# Patient Record
Sex: MALE | Race: WHITE | Employment: UNEMPLOYED | URBAN - METROPOLITAN AREA
[De-identification: names, ages, dates, MRNs, and addresses within clinical notes are randomized per-mention and may not be internally consistent; named-entity substitution may affect disease eponyms.]

---

## 2021-02-18 ENCOUNTER — MEDICAL CORRESPONDENCE (OUTPATIENT)
Dept: HEALTH INFORMATION MANAGEMENT | Facility: CLINIC | Age: 31
End: 2021-02-18

## 2021-03-11 ENCOUNTER — HOSPITAL ENCOUNTER (OUTPATIENT)
Dept: NUTRITION | Facility: CLINIC | Age: 31
Discharge: HOME | End: 2021-03-11
Attending: PHYSICIAN ASSISTANT | Admitting: PHYSICIAN ASSISTANT
Payer: COMMERCIAL

## 2021-03-11 PROCEDURE — 97802 MEDICAL NUTRITION INDIV IN: CPT | Mod: 95 | Performed by: DIETITIAN, REGISTERED

## 2021-03-11 NOTE — PROGRESS NOTES
Video-Visit Details    Type of service:  Video Visit    Video Start Time (time video started): 2:28    Video End Time (time video stopped): 3:14    Originating Location (pt. Location): Other treatment center    Distant Location (provider location):  [facility] NUTRITION SERVICES     Mode of Communication:  Video Conference via [facility]    OUTPATIENT NUTRITION ASSESSMENT (VIDEO VISIT DUE TO COVID-19)  REASON FOR ASSESSMENT  Michael Abraham referred by Mike Velasquez PA-C for MNT related to eating disorder.    Patient accompanied by self     ASSESSMENT   Nutrition History:  - Information obtained from patient.  - Patient is on a restricted diet at home/treatment center.  Patient with history of binge eating since his dad passed away at age 11 and mother passed away at age 22.  Patient concerned about heart health as both parents passed away related to heart issues.  Patient was fasting 20 hours and limiting calories to 1200 per day. Patient states he lost 80 lbs in 3 months (300 lbs-220 lbs).  Patient binge eats 1 time per week Saturday (eats 3 meals and eats whatever he wants entire cheesecake, pint of Delta and Jose's.  Patient fasts on Sunday and then does not eat again until Monday night.      [facility]  Patient states has worked with dietitians in the past for his eating disorder.    Patient states he is at treatment center for 90 days due to suicide attempt.     Diet Recall:  Breakfast: skips   Lunch: ham and cheese on hoagie bun with miracle whip, potato chips + 12 red grapes + baby carrots + 3.5 (3) thousand island dressing packets; cheesy mexican rice + 2 mild taco packets + burrito + sour cream + pear; 3 chicken wings 2 oz ranch 2 packets + ketchup + french fries + slices pears   Binge 2 small bags Keebler cookies + nut butter (2 bars)   Dinner: taco salad with 1 cup beef, nelli cheese, 1 cup black beans, 1 cup corn salsa, nelli cheese  "chips, lettuce and 1% milk ; 2 small pieces BB chicken + scalloped cheesy potatoes, mixed vegetables + lettuce tomato + cheese + fat free [place] ; 1.5 slices [place] steak, mashed potatoes, bread stick, salad, lettuce, tomato, cheese, 1 thousand island packet, 2 packets ketchup + 1 cup pineapple + 1 pear   Snack: limits   Beverages: 120 oz water   Dining out: none at this time   May have 2 bags candy bars during binge.  Patient was consuming 8,000-10,000 calories per day     Exercise: Patient speed walks 60 minutes per day 7 days per week + 20 pushups + 1 minute plank.    NUTRITION FOCUSED PHYSICAL ASSESSMENT (NFPA) FOR DIAGNOSING MALNUTRITION  Yes          Observed:   No nutrition-related physical findings observed    Obtained from Chart/Interdisciplinary Team:  None noted     LABS  Labs reviewed    MEDICATIONS  Medications reviewed    ANTHROPOMETRICS   Height: 5'11\"  Weight: 220 lbs (per pt)  BMI (kg/m2): 30.8kg/m2  Weight Status:  Obesity Grade I BMI 30-34.9  %IBW: 132%  Weight History:   Wt Readings from Last 10 Encounters:   10/31/14 108.9 kg (240 lb)     ASSESSED NUTRITION NEEDS  Estimated Energy Needs: 1565-9049 kcals/day (15-17 Kcal/Kg)  Justification:  (obese)  Estimated Protein Needs: 78-93 grams protein/day (1-1.2 g pro/Kg)  Justification:  (preservation of lean body mass)  Estimated Fluid Needs: 3456-1331 mL/day (30-35 mL/kg)    ASSESSED MALNUTRITION STATUS  % Weight Loss:  > 7.5% in 3 months (severe malnutrition) 25% weight loss in 3 months   % Intake:  <75% for >/= 3 months (non-severe malnutrition)  Subcutaneous Fat Loss:  None observed  Loss of Muscle Mass:  None observed  Fluid Retention:  None noted    Malnutrition Diagnosis:  Non-Severe malnutrition  In the Context of:  Chronic illness or disease    DIAGNOSIS   Nutrition Diagnosis:  Disordered eating pattern related to preoccupation with weight and body as evidenced by compensatory behaviors of binge eating, calorie counting and restriction   "     INTERVENTIONS   Nutrition Prescription   Recommend normalized eating pattern     IMPLEMENTATION   Assessed learning needs and learning preference  Teaching Method(s) used: Explanation  Vitamin and Mineral Supplements: recommend complete multivitamin and mineral   Diet Education:  Provided education on normalized eating pattern   Nutrition Education (Content):   a)  Discussed current eating pattern and ways to modify eating by eating 3 meals per day.  Patient is willing to consider eating breakfast.  Reviewed normalized eating and small steps patient could take to limit restriction.  Suggest patient reduce condiments as patient states his blood pressure is elevated and consumes multiple packets of condiments at meals.  Patient would like to continue losing weight but knows he cannot lose 7 lbs per week as he was when fasting and restricting calorie intake.  Supported patient with his struggle with eating disorder.     Nutrition Education (Application):   a)  Discussed current food options for breakfast at treatment center.    b)  Patient verbalizes understanding of diet by stating will reduce condiments.   Expected patient engagement: good    GOALS  Reduce condiments by 50%   Aim for 1800 calories per day to reduce restriction     FOLLOW UP/MONITORING   Progress towards goals will be monitored and evaluated per protocol and Practice Guidelines  Patient to follow up in 2 weeks  RD name and number provided     Time Spent with Patient  46 minutes    Rebeca Moreno, RD, LD  [facility] Outpatient Dietitian  [phone] (office phone)

## 2021-03-24 ENCOUNTER — HOSPITAL ENCOUNTER (OUTPATIENT)
Dept: NUTRITION | Facility: CLINIC | Age: 31
Discharge: HOME | End: 2021-03-24
Attending: DIETITIAN, REGISTERED | Admitting: DIETITIAN, REGISTERED
Payer: COMMERCIAL

## 2021-03-24 PROCEDURE — 97803 MED NUTRITION INDIV SUBSEQ: CPT | Mod: GT | Performed by: DIETITIAN, REGISTERED

## 2021-03-24 NOTE — PROGRESS NOTES
Video-Visit Details    Type of service:  Video Visit    Video Start Time (time video started): 2:03    Video End Time (time video stopped): 2:26    Originating Location (pt. Location): Other treatment center    Distant Location (provider location):  [facility] NUTRITION SERVICES     Mode of Communication:  Video Conference via [facility]    OUTPATIENT NUTRITION ASSESSMENT (VIDEO VISIT DUE TO COVID-19)  REASON FOR ASSESSMENT  Michael Abraham referred by Mike Velasquez PA-C for MNT related to eating disorder.    Patient accompanied by self      ASSESSMENT   Nutrition History:  - Information obtained from patient.  - Patient is on a restricted diet at home/treatment center.  Patient with history of binge eating since his dad passed away at age 11 and mother passed away at age 22.  Patient concerned about heart health as both parents passed away related to heart issues.  Patient was fasting 20 hours and limiting calories to 1200 per day. Patient states he lost 80 lbs in 3 months (300 lbs-220 lbs).  Patient binge eats 1 time per week Saturday (eats 3 meals and eats whatever he wants entire cheesecake, pint of Delta and Jose's.  Patient fasts on Sunday and then does not eat again until Monday night.       [facility].  Patient states has worked with dietitians in the past for his eating disorder.     Patient states he is at treatment center for 90 days due to suicide attempt.     3/24/2021 Patient states has increased binging.  Patient bought portion control cups to help with foods being served at treatment center.  Patient binges 3 times per week.  Patient concerned about his blood pressure and states was asked by his nurse to discuss during appointment.    Recent intake:  3/20 binge day (3 slices stuffed crust pepperoni pizza, edible cookie doigh (16 oz) 2 oz pizza suace, BBQ sauce, ranch dressing, honey mustard, strawberry cream cheese, ranch, marianara  sauce, 1 cup lettuce, 1.3 oz thousand island dressing, apple, pear, 16 oz cookie dough, 3 pieces fried fish, french fries 6 packets tater sauce    3/21/2021 1 bowl René Charms cereal, 1 bowl Cocoa Puffs cereal, milk, 2 strawberry cream cheese containers, 2 popcorn shrimp, french fries, 2 biscuits, 2 thousand island dressing packers, eggs, gravy, peachs, hard boiled eggs, meat malick with chives and cheese    3/22/2021 2 bagels, 6 containers 1 oz peanut butter, 6 bowls Honey Nut Cheerios, 2 cheam cheese, 3 strawberry jam, hash browns, eggs with cheese and sausage,   blueebery bagel, cocoa puffs, 8 -3/4 oz cream cheese, 1 pink starburst, 1 apple, 1 fruit cocktail, 2 burrtio (beef and cheese) 2 bags nachos chips, 2 cups nelli cheese, 4 sour cream, thousand island dressing packets, 2 slices carrot cake, 2 chicken delong meals, 1 vegetable, 1 thousand island packet, 2 Rice Krispie bars    Patient states his weight fluctuating between 219-232 lbs.      Diet Recall:  Breakfast: skips   Lunch: ham and cheese on hoagie bun with miracle whip, potato chips + 12 red grapes + baby carrots + 3.5 (3) thousand island dressing packets; cheesy mexican rice + 2 mild taco packets + burrito + sour cream + pear; 3 chicken wings 2 oz ranch 2 packets + ketchup + french fries + slices pears   Binge 2 small bags Keebler cookies + nut butter (2 bars)   Dinner: taco salad with 1 cup beef, nelli cheese, 1 cup black beans, 1 cup corn salsa, nelli cheese chips, lettuce and 1% milk ; 2 small pieces BB chicken + scalloped cheesy potatoes, mixed vegetables + lettuce tomato + cheese + fat free french ; 1.5 slices [place] steak, mashed potatoes, bread stick, salad, lettuce, tomato, cheese, 1 thousand island packet, 2 packets ketchup + 1 cup pineapple + 1 pear   Snack: limits   Beverages: 120 oz water   Dining out: none at this time   May have 2 bags candy bars during binge.  Patient was consuming 8,000-10,000 calories per day      Exercise: Patient  "speed walks 60 minutes per day 7 days per week + 20 pushups + 1 minute plank.     NUTRITION FOCUSED PHYSICAL ASSESSMENT (NFPA) FOR DIAGNOSING MALNUTRITION  Yes          Observed:   No nutrition-related physical findings observed     Obtained from Chart/Interdisciplinary Team:  None noted      LABS  Labs reviewed     MEDICATIONS  Medications reviewed     ANTHROPOMETRICS   Height: 5'11\"  Weight: 232 lbs (per pt)  BMI (kg/m2): 32.3 kg/m2  Weight Status:  Obesity Grade I BMI 30-34.9  %IBW: 134%  Weight History: None known.      ASSESSED NUTRITION NEEDS  Estimated Energy Needs: 6260-5849 kcals/day (15-17 Kcal/Kg)  Justification:  (obese)  Estimated Protein Needs: 78-93 grams protein/day (1-1.2 g pro/Kg)  Justification:  (preservation of lean body mass)  Estimated Fluid Needs: 6994-3114 mL/day (30-35 mL/kg)     ASSESSED MALNUTRITION STATUS  % Weight Loss:  > 7.5% in 3 months (severe malnutrition) 25% weight loss in 3 months   % Intake:  <75% for >/= 3 months (non-severe malnutrition)  Subcutaneous Fat Loss:  None observed  Loss of Muscle Mass:  None observed  Fluid Retention:  None noted     Malnutrition Diagnosis:  Non-Severe malnutrition  In the Context of:  Chronic illness or disease    EVALUATION/PROGRESS TOWARDS GOALS  Previous nutrition goals:  Reduce condiments by 50%-not met   Aim for 1800 calories per day to reduce restriction-not met     Previous nutrition diagnosis: Disordered eating pattern related to preoccupation with weight and body as evidenced by compensatory behaviors of binge eating, calorie counting and restriction -no change     Current nutrition diagnosis: Disordered eating pattern related to preoccupation with weight and body as evidenced by compensatory behaviors of binge eating, calorie counting and restriction         INTERVENTIONS   Nutrition Prescription   Recommend normalized eating pattern      IMPLEMENTATION   Assessed learning needs and learning preference  Teaching Method(s) used: " Explanation  Vitamin and Mineral Supplements: recommend complete multivitamin and mineral   Diet Education:  Provided education on normalized eating pattern   Nutrition Education (Content):              a)  Discussed progress towards goals.  Reviewed intake.  Discussed current eating pattern and ways to reduced sodium content in diet.  Instructed patient to read label on salad dressing packet which was 300 mg per packet which patient consumes multiple per meal.  Discussed DASH diet with focus on fruits and vegetables.  Congratulated patient for starting to eat breakfast.  Suggest patient reduce condiments as patient states his blood pressure is elevated and consumes multiple packets of condiments at meals.  Supported patient with his struggle with eating disorder.   Nutrition Education (Application):              a)  Discussed current food options for breakfast at treatment center.               b)  Patient verbalizes understanding of diet by stating will reduce condiments.   Expected patient engagement: good     GOALS  Aim for 2 fruit servings at breakfast  Aim for 2 vegetable servings at lunch and dinner   Reduce condiment packets to 4 per day     FOLLOW UP/MONITORING   Progress towards goals will be monitored and evaluated per protocol and Practice Guidelines  Patient to follow up in 2 weeks  RD name and number provided      Time Spent with Patient  23 minutes     Rebeca Moreno, RD, LD  [facility] Outpatient Dietitian  [phone] (office phone)

## 2021-04-12 ENCOUNTER — HOSPITAL ENCOUNTER (OUTPATIENT)
Dept: NUTRITION | Facility: CLINIC | Age: 31
Discharge: HOME | End: 2021-04-12
Attending: DIETITIAN, REGISTERED | Admitting: DIETITIAN, REGISTERED
Payer: COMMERCIAL

## 2021-04-12 PROCEDURE — 97803 MED NUTRITION INDIV SUBSEQ: CPT | Mod: 95 | Performed by: DIETITIAN, REGISTERED

## 2021-04-12 NOTE — PROGRESS NOTES
Originating Location (pt. Location): [facility]     Distant Location (provider location):  [facility] NUTRITION SERVICES     Mode of Communication: Telephone     OUTPATIENT NUTRITION ASSESSMENT (TELEPHONE VISIT DUE TO COVID-19)  REASON FOR ASSESSMENT  Michael Abraham referred by Mike Velasquez PA-C for MNT related to eating disorder.    Patient accompanied by [facility] staff worker     ASSESSMENT   Nutrition History:  - Information obtained from patient.  - Patient is on a restricted diet at home/treatment center.  Patient with history of binge eating since his dad passed away at age 11 and mother passed away at age 24.  Patient concerned about heart health as both parents passed away related to heart issues.  Patient was fasting 20 hours and limiting calories to 1200 per day. Patient states he lost 80 lbs in 3 months (300 lbs-220 lbs).  Patient binge eats 1 time per week Saturday (eats 3 meals and eats whatever he wants entire cheesecake, pint of Delta and Jose's.  Patient fasts on Sunday and then does not eat again until Monday night.       Patient had eating disorder evaluation at the [facility].  Patient states has worked with dietitians in the past for his eating disorder.     Patient states he is at treatment center for 90 days due to suicide attempt.      3/24/2021 Patient states has increased binging.  Patient bought portion control cups to help with foods being served at treatment center.  Patient binges 3 times per week.  Patient concerned about his blood pressure and states was asked by his nurse to discuss during appointment.     Recent intake:  3/20 binge day (3 slices stuffed crust pepperoni pizza, edible cookie doigh (16 oz) 2 oz pizza suace, BBQ sauce, ranch dressing, honey mustard, strawberry cream cheese, ranch, marianara sauce, 1 cup lettuce, 1.3 oz thousand island dressing, apple, pear, 16 oz cookie dough, 3 pieces fried fish, french fries 6 packets tater  "sauce     3/21/2021 1 bowl René Charms cereal, 1 bowl Cocoa Puffs cereal, milk, 2 strawberry cream cheese containers, 2 popcorn shrimp, french fries, 2 biscuits, 2 thousand island dressing packers, eggs, gravy, peachs, hard boiled eggs, meat malick with chives and cheese     3/22/2021 2 bagels, 6 containers 1 oz peanut butter, 6 bowls Honey Nut Cheerios, 2 cheam cheese, 3 strawberry jam, hash browns, eggs with cheese and sausage,   blueebery bagel, cocoa puffs, 8 -3/4 oz cream cheese, 1 pink starburst, 1 apple, 1 fruit cocktail, 2 burrtio (beef and cheese) 2 bags nachos chips, 2 cups nelli cheese, 4 sour cream, thousand island dressing packets, 2 slices carrot cake, 2 chicken delong meals, 1 vegetable, 1 thousand island packet, 2 Rice Krispie bars     Patient states his weight fluctuating between 219-232 lbs.     4/12/2021 binge eating daily; weight increase 250 lbs; ordering tub of ice cream and eating entire container.  Patient struggles with \"good\" and \"bad\".  Patient working with therapist for phobias but not eating disorder. Patient with multiple questions that are therapist related.  Patient states his best friends and parents were killed in a car accident last night so feels out of control today regarding wanting to binge.       Diet Recall:  Breakfast: skips   Lunch: ham and cheese on hoagie bun with miracle whip, potato chips + 12 red grapes + baby carrots + 3.5 (3) thousand island dressing packets; cheesy mexican rice + 2 mild taco packets + burrito + sour cream + pear; 3 chicken wings 2 oz ranch 2 packets + ketchup + french fries + slices pears   Binge 2 small bags Keebler cookies + nut butter (2 bars)   Dinner: taco salad with 1 cup beef, nelli cheese, 1 cup black beans, 1 cup corn salsa, nelli cheese chips, lettuce and 1% milk ; 2 small pieces BB chicken + scalloped cheesy potatoes, mixed vegetables + lettuce tomato + cheese + fat free french ; 1.5 slices [place] steak, mashed potatoes, bread stick, " "salad, lettuce, tomato, cheese, 1 thousand island packet, 2 packets ketchup + 1 cup pineapple + 1 pear   Snack: limits   Beverages: 120 oz water   Dining out: none at this time   May have 2 bags candy bars during binge.  Patient was consuming 8,000-10,000 calories per day      Exercise: Patient speed walks 60 minutes per day 7 days per week + 20 pushups + 1 minute plank.     NUTRITION FOCUSED PHYSICAL ASSESSMENT (NFPA) FOR DIAGNOSING MALNUTRITION  Yes          Observed:   No nutrition-related physical findings observed     Obtained from Chart/Interdisciplinary Team:  None noted      LABS  Labs reviewed     MEDICATIONS  Medications reviewed     ANTHROPOMETRICS   Height: 5'11\"  Weight: 250 lbs (per pt)  BMI (kg/m2): 34.8 kg/m2  Weight Status:  Obesity Grade I BMI 30-34.9  %IBW: 145%  Weight History: None known.      ASSESSED NUTRITION NEEDS  Estimated Energy Needs: 4518-3970 kcals/day (15-17 Kcal/Kg)  Justification:  (obese)  Estimated Protein Needs: 78-93 grams protein/day (1-1.2 g pro/Kg)  Justification:  (preservation of lean body mass)  Estimated Fluid Needs: 1122-5677 mL/day (30-35 mL/kg)     ASSESSED MALNUTRITION STATUS  % Weight Loss:  > 7.5% in 3 months (severe malnutrition) 25% weight loss in 3 months   % Intake:  <75% for >/= 3 months (non-severe malnutrition)  Subcutaneous Fat Loss:  None observed  Loss of Muscle Mass:  None observed  Fluid Retention:  None noted     Malnutrition Diagnosis:  Non-Severe malnutrition  In the Context of:  Chronic illness or disease     EVALUATION/PROGRESS TOWARDS GOALS  Previous nutrition goals:  Aim for 2 fruit servings at breakfast-not met   Aim for 2 vegetable servings at lunch and dinner-not met   Reduce condiment packets to 4 per day-not met     Previous nutrition diagnosis: Disordered eating pattern related to preoccupation with weight and body as evidenced by compensatory behaviors of binge eating, calorie counting and restriction -no change      Current nutrition " diagnosis: Disordered eating pattern related to preoccupation with weight and body as evidenced by compensatory behaviors of binge eating, calorie counting and restriction         INTERVENTIONS   Nutrition Prescription   Recommend normalized eating pattern      IMPLEMENTATION   Assessed learning needs and learning preference  Teaching Method(s) used: Explanation  Vitamin and Mineral Supplements: recommend complete multivitamin and mineral   Diet Education:  Provided education on normalized eating pattern   Nutrition Education (Content):              a)  Discussed progress towards goals.  Reviewed intake.  Discussed current eating pattern and structured eating to reduce binging.  Congratulated patient for his awareness of increased binging.  Recommend patient make about with eating disorder therapist.  Offered suggestions for local eating disorder locations.  Supported patient with his struggle with eating disorder.   Nutrition Education (Application):              a)  Discussed eating provided meal at treatment center and limiting binge foods from the extra refrigerator.              b)  Patient verbalizes understanding of diet by stating will aim to eat main meal.   Expected patient engagement: good     GOALS  Eat provided meals at treatment center  Avoid foods from extra refrigerator at treatment center      FOLLOW UP/MONITORING   Progress towards goals will be monitored and evaluated per protocol and Practice Guidelines  Patient to follow up in 1 week  RD name and number provided      Time Spent with Patient  15 minutes     Rebeca Moreno, RD, LD  [facility] Outpatient Dietitian  [phone] (office phone)

## 2021-04-23 ENCOUNTER — HOSPITAL ENCOUNTER (OUTPATIENT)
Dept: NUTRITION | Facility: CLINIC | Age: 31
Discharge: HOME | End: 2021-04-23
Attending: DIETITIAN, REGISTERED | Admitting: DIETITIAN, REGISTERED
Payer: COMMERCIAL

## 2021-04-23 PROCEDURE — 97803 MED NUTRITION INDIV SUBSEQ: CPT | Mod: 95 | Performed by: DIETITIAN, REGISTERED

## 2021-04-23 NOTE — PROGRESS NOTES
Video-Visit Details    Type of service:  Video Visit    Video Start Time (time video started): 1:34    Video End Time (time video stopped): 1:50    Originating Location (pt. Location): Other treatment center     Distant Location (provider location):  [facility] NUTRITION SERVICES -remote     Mode of Communication:  Video Conference via [facility]    OUTPATIENT NUTRITION ASSESSMENT (VIDEO VISIT DUE TO COVID-19)  REASON FOR ASSESSMENT  Michael Abraham referred by Mike Velasquez PA-C for MNT related to eating disorder.    Patient accompanied by self.     ASSESSMENT   Nutrition History:  - Information obtained from patient.  - Patient is on a restricted diet at home/treatment center.  Patient with history of binge eating since his dad passed away at age 11 and mother passed away at age 22.  Patient concerned about heart health as both parents passed away related to heart issues.  Patient was fasting 20 hours and limiting calories to 1200 per day. Patient states he lost 80 lbs in 3 months (300 lbs-220 lbs).  Patient binge eats 1 time per week Saturday (eats 3 meals and eats whatever he wants entire cheesecake, pint of Delta and Jose's.  Patient fasts on Sunday and then does not eat again until Monday night.       Patient had [facility].  Patient states has worked with dietitians in the past for his eating disorder.     Patient states he is at treatment center for 90 days due to suicide attempt.      3/24/2021 Patient states has increased binging.  Patient bought portion control cups to help with foods being served at treatment center.  Patient binges 3 times per week.  Patient concerned about his blood pressure and states was asked by his nurse to discuss during appointment.     Recent intake:  3/20 binge day (3 slices stuffed crust pepperoni pizza, edible cookie doigh (16 oz) 2 oz pizza suace, BBQ sauce, ranch dressing, honey mustard, strawberry cream cheese, ranch,  "marianara sauce, 1 cup lettuce, 1.3 oz thousand island dressing, apple, pear, 16 oz cookie dough, 3 pieces fried fish, french fries 6 packets tater sauce     3/21/2021 1 bowl René Charms cereal, 1 bowl Cocoa Puffs cereal, milk, 2 strawberry cream cheese containers, 2 popcorn shrimp, french fries, 2 biscuits, 2 thousand island dressing packers, eggs, gravy, peachs, hard boiled eggs, meat malick with chives and cheese     3/22/2021 2 bagels, 6 containers 1 oz peanut butter, 6 bowls Honey Nut Cheerios, 2 cheam cheese, 3 strawberry jam, hash browns, eggs with cheese and sausage,   blueebery bagel, cocoa puffs, 8 -3/4 oz cream cheese, 1 pink starburst, 1 apple, 1 fruit cocktail, 2 burrtio (beef and cheese) 2 bags nachos chips, 2 cups nelli cheese, 4 sour cream, thousand island dressing packets, 2 slices carrot cake, 2 chicken delong meals, 1 vegetable, 1 thousand island packet, 2 Rice Krispie bars     Patient states his weight fluctuating between 219-232 lbs.      4/12/2021 binge eating daily; weight increase 250 lbs; ordering tub of ice cream and eating entire container.  Patient struggles with \"good\" and \"bad\".  Patient working with therapist for phobias but not eating disorder. Patient with multiple questions that are therapist related.  Patient states his best friends and parents were killed in a car accident last night so feels out of control today regarding wanting to binge.      4/23/2021  Patient states [facility] and is on 8 week waiting list.  Patient was hospitalized due to mental health crisis.  Patient dealing with multiple recent deaths.  Patient states his binging has increased and can't stop.      Diet Recall:  Breakfast: skips   Lunch: ham and cheese on hoagie bun with miracle whip, potato chips + 12 red grapes + baby carrots + 3.5 (3) thousand island dressing packets; cheesy mexican rice + 2 mild taco packets + burrito + sour cream + pear; 3 chicken wings 2 oz ranch 2 packets + " "ketchup + french fries + slices pears   Binge 2 small bags Keebler cookies + nut butter (2 bars)   Dinner: taco salad with 1 cup beef, nelli cheese, 1 cup black beans, 1 cup corn salsa, nelli cheese chips, lettuce and 1% milk ; 2 small pieces BB chicken + scalloped cheesy potatoes, mixed vegetables + lettuce tomato + cheese + fat free french ; 1.5 slices Killian steak, mashed potatoes, bread stick, salad, lettuce, tomato, cheese, 1 thousand island packet, 2 packets ketchup + 1 cup pineapple + 1 pear   Snack: limits   Beverages: 120 oz water   Dining out: none at this time   May have 2 bags candy bars during binge.  Patient was consuming 8,000-10,000 calories per day      Exercise: Patient speed walks 60 minutes per day 7 days per week + 20 pushups + 1 minute plank.     NUTRITION FOCUSED PHYSICAL ASSESSMENT (NFPA) FOR DIAGNOSING MALNUTRITION  Yes          Observed:   No nutrition-related physical findings observed     Obtained from Chart/Interdisciplinary Team:  None noted      LABS  Labs reviewed     MEDICATIONS  Medications reviewed     ANTHROPOMETRICS   Height: 5'11\"  Weight: 250 lbs (per pt)  BMI (kg/m2): 34.8 kg/m2  Weight Status:  Obesity Grade I BMI 30-34.9  %IBW: 145%  Weight History: None known.      ASSESSED NUTRITION NEEDS  Estimated Energy Needs: 2658-9203 kcals/day (15-17 Kcal/Kg)  Justification:  (obese)  Estimated Protein Needs: 78-93 grams protein/day (1-1.2 g pro/Kg)  Justification:  (preservation of lean body mass)  Estimated Fluid Needs: 3607-5959 mL/day (30-35 mL/kg)     ASSESSED MALNUTRITION STATUS  % Weight Loss:  > 7.5% in 3 months (severe malnutrition) 25% weight loss in 3 months   % Intake:  <75% for >/= 3 months (non-severe malnutrition)  Subcutaneous Fat Loss:  None observed  Loss of Muscle Mass:  None observed  Fluid Retention:  None noted     Malnutrition Diagnosis:  Non-Severe malnutrition  In the Context of:  Chronic illness or disease     EVALUATION/PROGRESS TOWARDS GOALS  Previous " nutrition goals:  Eat provided meals at treatment center-improving   Avoid foods from extra refrigerator at treatment center-improving       Previous nutrition diagnosis: Disordered eating pattern related to preoccupation with weight and body as evidenced by compensatory behaviors of binge eating, calorie counting and restriction -no change      Current nutrition diagnosis: Disordered eating pattern related to preoccupation with weight and body as evidenced by compensatory behaviors of binge eating, calorie counting and restriction         INTERVENTIONS   Nutrition Prescription   Recommend normalized eating pattern      IMPLEMENTATION   Assessed learning needs and learning preference  Teaching Method(s) used: Explanation  Vitamin and Mineral Supplements: recommend complete multivitamin and mineral   Diet Education:  Provided education on normalized eating pattern   Nutrition Education (Content):              a)  Discussed recent eating disorder intake and placement on waiting list for eating disorder treatment.  Discussed ways to manage binge eating until able to receive higher level of care.  Offered suggestions for local eating disorder locations if insurance does not cover current treatment center.  Supported patient with his struggle with eating disorder.   Nutrition Education (Application):              a)  Discussed eating meals and snacks in the community room where he eats his meals.                b)  Patient verbalizes of diet by stating will aim to eat only in community room.  Expected patient engagement: good     GOALS  Eat provided meals at treatment center  Avoid foods from extra refrigerator at treatment center   Eat all meals and snacks in the community room     FOLLOW UP/MONITORING   Progress towards goals will be monitored and evaluated per protocol and Practice Guidelines  Patient to follow up in 2 week  RD name and number provided      Time Spent with Patient  16 minutes     Cori Angel  Rebeca Adams, RD, LD  [facility] Outpatient Dietitian  [phone] (office phone)

## 2021-07-28 ENCOUNTER — TELEPHONE (OUTPATIENT)
Dept: NUTRITION | Facility: CLINIC | Age: 31
End: 2021-07-28

## 2021-07-28 NOTE — PROGRESS NOTES
Sent video link to email and cell phone for scheduled appointment 8:00 AM 7/28/2021.  Called patient x 4 (both numbers listed in demographics).  Left message with RD name and number to call to reschedule.  Await call back from patient.    Rebeca Moreno, RD, LD  [facility] Outpatient Dietitian  [phone] (office phone)

## 2025-08-12 ENCOUNTER — TELEPHONE (OUTPATIENT)
Dept: BEHAVIORAL HEALTH | Facility: CLINIC | Age: 35
End: 2025-08-12